# Patient Record
Sex: FEMALE | Race: WHITE | Employment: PART TIME | URBAN - METROPOLITAN AREA
[De-identification: names, ages, dates, MRNs, and addresses within clinical notes are randomized per-mention and may not be internally consistent; named-entity substitution may affect disease eponyms.]

---
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
## 2018-03-13 ENCOUNTER — OFFICE VISIT (OUTPATIENT)
Dept: FAMILY MEDICINE CLINIC | Age: 18
End: 2018-03-13

## 2018-03-13 VITALS
WEIGHT: 123 LBS | BODY MASS INDEX: 20.49 KG/M2 | HEIGHT: 65 IN | DIASTOLIC BLOOD PRESSURE: 70 MMHG | SYSTOLIC BLOOD PRESSURE: 110 MMHG | HEART RATE: 74 BPM | RESPIRATION RATE: 12 BRPM

## 2018-03-13 DIAGNOSIS — Z01.84 IMMUNITY STATUS TESTING: ICD-10-CM

## 2018-03-13 DIAGNOSIS — Z00.00 WELL ADULT EXAM: ICD-10-CM

## 2018-03-13 LAB
BASOPHILS ABSOLUTE: 0.1 K/UL (ref 0–0.2)
BASOPHILS RELATIVE PERCENT: 0.7 %
EOSINOPHILS ABSOLUTE: 0 K/UL (ref 0–0.6)
EOSINOPHILS RELATIVE PERCENT: 0.5 %
HBV SURFACE AB TITR SER: <3.5 MIU/ML
HCT VFR BLD CALC: 39.2 % (ref 36–48)
HEMOGLOBIN: 13.5 G/DL (ref 12–16)
LYMPHOCYTES ABSOLUTE: 2.1 K/UL (ref 1–5.1)
LYMPHOCYTES RELATIVE PERCENT: 25.7 %
MCH RBC QN AUTO: 32.1 PG (ref 26–34)
MCHC RBC AUTO-ENTMCNC: 34.5 G/DL (ref 31–36)
MCV RBC AUTO: 92.9 FL (ref 80–100)
MONOCYTES ABSOLUTE: 0.5 K/UL (ref 0–1.3)
MONOCYTES RELATIVE PERCENT: 6.5 %
NEUTROPHILS ABSOLUTE: 5.5 K/UL (ref 1.7–7.7)
NEUTROPHILS RELATIVE PERCENT: 66.6 %
PDW BLD-RTO: 13.8 % (ref 12.4–15.4)
PLATELET # BLD: 231 K/UL (ref 135–450)
PMV BLD AUTO: 9.5 FL (ref 5–10.5)
RBC # BLD: 4.22 M/UL (ref 4–5.2)
RUBELLA ANTIBODY IGG: 26.7 IU/ML
WBC # BLD: 8.3 K/UL (ref 4–11)

## 2018-03-13 PROCEDURE — 99395 PREV VISIT EST AGE 18-39: CPT | Performed by: NURSE PRACTITIONER

## 2018-03-13 ASSESSMENT — PATIENT HEALTH QUESTIONNAIRE - PHQ9
2. FEELING DOWN, DEPRESSED OR HOPELESS: 0
SUM OF ALL RESPONSES TO PHQ QUESTIONS 1-9: 0
SUM OF ALL RESPONSES TO PHQ9 QUESTIONS 1 & 2: 0
1. LITTLE INTEREST OR PLEASURE IN DOING THINGS: 0

## 2018-03-13 NOTE — PROGRESS NOTES
History and Physical      Haim Zee  YOB: 2000    Date of Service:  3/13/2018    Chief Complaint:   Haim Zee is a 21 y.o. female who presents for complete physical examination. HPI: Patient is here for her physical. She reports that she is doing well without compliants. She has not needed acne medication. 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 Wt Readings from Last 3 Encounters:   03/13/18 123 lb (55.8 kg) (41 %, Z= -0.23)*   09/04/17 116 lb 13.5 oz (53 kg) (30 %, Z= -0.52)*   04/03/17 117 lb (53.1 kg) (32 %, Z= -0.46)*     * Growth percentiles are based on [facility] 2-20 Years data. BP Readings from Last 3 Encounters:   03/13/18 110/70   09/04/17 (!) 95/55   04/03/17 110/80       Patient Active Problem List   Diagnosis    Allergic rhinitis, seasonal    Innocent heart murmur    Acne       Preventive Care:  Health Maintenance   Topic Date Due    HIV screen  08/13/2013    Chlamydia screen  08/13/2014    Flu vaccine (1) 09/01/2017    DTaP/Tdap/Td vaccine (7 - Td) 08/19/2021    Meningococcal (MCV) Vaccine Age 0-22 Years  Completed      Hx abnormal PAP: Never had pap smear. GYN is Dr. Isaias Dickinson.   Sexual activity: single partner, contraception - condoms and OCP (estrogen/progesterone)   Self-breast exams: no  Last eye exam: 2/2018, normal  Exercise: aerobics 7 time(s) per day  Seatbelt use: always   Diet-   Lipid panel: No results found for: CHOL, TRIG, HDL, LDLCALC, LDLDIRECT     Advance Directive: N, Not Received    Immunization History   Administered Date(s) Administered    DTaP 2000, 2000, 06/15/1999, 04/13/2000, 06/02/2003    HPV Gardasil Quadrivalent 06/22/2015, 08/24/2015, 03/14/2016    Hepatitis B, unspecified formulation 2000, 2000, 06/29/1999    Hib, unspecified foumulation 1998, 1998, 02/22/2000, 04/13/2000    IPV (Ipol) 1998, 1998, 04/13/2000, 09/05/2002    MMR 02/22/2000, 06/02/2003    Meningococcal MCV4P (Menactra) 06/22/2015    PPD Test 03/14/2016    Tdap (Boostrix, Adacel) 08/19/2011    Varicella (Varivax) 01/01/2003, 08/19/2011       No Known Allergies  Outpatient Prescriptions Marked as Taking for the 3/13/18 encounter (Office Visit) with Bettye Valencia CNP   Medication Sig Dispense Refill    Norethin-Eth Estrad-Fe Biphas (LO LOESTRIN FE PO) Take 1 tablet by mouth daily       albuterol (PROAIR HFA) 108 (90 BASE) MCG/ACT inhaler Inhale 2 puffs into the lungs every 4 hours as needed for Wheezing or Shortness of Breath 1 Inhaler 3    loratadine (CLARITIN) 10 MG tablet Take 10 mg by mouth daily. History reviewed. No pertinent past medical history. History reviewed. No pertinent surgical history. Family History   Problem Relation Age of Onset    Heart Disease Maternal Grandfather     Heart Disease Other      Social History     Social History    Marital status: Single     Spouse name: N/A    Number of children: N/A    Years of education: N/A     Occupational History    Not on file. Social History Main Topics    Smoking status: Never Smoker    Smokeless tobacco: Never Used      Comment: counseled on tobacco exposure avoidance    Alcohol use No    Drug use: No    Sexual activity: No     Other Topics Concern    Not on file     Social History Narrative    No narrative on file       Review of Systems:  All other systems reviewed and were negative except for what was noted in the HPI. Physical Exam:   Vitals:    03/13/18 1439   BP: 110/70   Site: Right Arm   Position: Sitting   Cuff Size: Medium Adult   Pulse: 74   Resp: 12   Weight: 123 lb (55.8 kg)   Height: 5' 5\" (1.651 m)     Body mass index is 20.47 kg/m². Constitutional: She is oriented to person, place, and time. She appears well-developed and well-nourished. No distress. HEENT:   Head: Normocephalic and atraumatic.    Right Ear: Tympanic membrane, external ear and ear canal normal.   Left Ear: Tympanic membrane, external ear and ear canal normal.   Nose: Nose normal.   Mouth/Throat: Oropharynx is clear and moist, and mucous membranes are normal.  There is no cervical adenopathy. Eyes: Conjunctivae and extraocular motions are normal. Pupils are equal, round, and reactive to light. Neck: Neck supple. No JVD present. Carotid bruit is not present. No mass and no thyromegaly present. Cardiovascular: Normal rate, regular rhythm, normal heart sounds and intact distal pulses. Exam reveals no gallop and no friction rub. No murmur heard. Pulmonary/Chest: Effort normal and breath sounds normal. No respiratory distress. She has no wheezes, rhonchi or rales. Abdominal: Soft, non-tender. Bowel sounds are normal. She exhibits no organomegaly. Musculoskeletal: Normal range of motion, no synovitis. She exhibits no edema. Neurological: She is alert and oriented to person, place, and time. She has normal reflexes. No cranial nerve deficit. Coordination normal.   Skin: Skin is warm and dry. There is no rash or erythema. No suspicious lesions noted. Psychiatric: She has a normal mood and affect. Her speech is normal and behavior is normal. Judgment, cognition and memory are normal.     Assessment/Plan:    Eliot Valentin was seen today for annual exam.    Diagnoses and all orders for this visit:    Well adult exam  -     Rubeola Antibody, IgG; Future  -     Mumps Antibody, IgG; Future  -     Rubella IGG; Future  -     Varicella Zoster Antibody, IgG; Future  -     Hepatitis B Surface Antibody; Future  -     CBC Auto Differential; Future  Healthy lifestyles reviewed: diet, aerobic exercise, sunscreen, vision and dental exams, safe sexual practices, avoiding alcohol and drugs, no texting and driving. Continue to f/u with GYN Dr. Luke Flores. Will need pap smear at age 22. Immunity status testing  -     Rubeola Antibody, IgG; Future  -     Mumps Antibody, IgG; Future  -     Rubella IGG; Future  -     Varicella Zoster Antibody, IgG;  Future  -     Hepatitis B

## 2018-03-15 LAB
MUV IGG SER QL: 5 AU/ML
RUBEOLA (MEASLES) AB IGG: >300 AU/ML
VZV IGG SER QL IA: 845 IV

## 2018-03-16 ENCOUNTER — NURSE ONLY (OUTPATIENT)
Dept: FAMILY MEDICINE CLINIC | Age: 18
End: 2018-03-16

## 2018-03-16 DIAGNOSIS — Z23 IMMUNIZATION DUE: Primary | ICD-10-CM

## 2018-03-16 PROCEDURE — 90472 IMMUNIZATION ADMIN EACH ADD: CPT | Performed by: FAMILY MEDICINE

## 2018-03-16 PROCEDURE — 90471 IMMUNIZATION ADMIN: CPT | Performed by: FAMILY MEDICINE

## 2018-03-16 PROCEDURE — 90707 MMR VACCINE SC: CPT | Performed by: FAMILY MEDICINE

## 2018-03-16 PROCEDURE — 90744 HEPB VACC 3 DOSE PED/ADOL IM: CPT | Performed by: FAMILY MEDICINE

## 2018-07-02 ENCOUNTER — TELEPHONE (OUTPATIENT)
Dept: FAMILY MEDICINE CLINIC | Age: 18
End: 2018-07-02

## 2018-07-02 DIAGNOSIS — Z11.59 NEED FOR HEPATITIS B SCREENING TEST: Primary | ICD-10-CM

## 2018-07-07 ENCOUNTER — NURSE ONLY (OUTPATIENT)
Dept: FAMILY MEDICINE CLINIC | Age: 18
End: 2018-07-07

## 2018-07-07 PROCEDURE — 86580 TB INTRADERMAL TEST: CPT | Performed by: FAMILY MEDICINE

## 2018-07-07 PROCEDURE — 90471 IMMUNIZATION ADMIN: CPT | Performed by: FAMILY MEDICINE

## 2018-07-07 PROCEDURE — 90744 HEPB VACC 3 DOSE PED/ADOL IM: CPT | Performed by: FAMILY MEDICINE

## 2018-07-09 ENCOUNTER — NURSE ONLY (OUTPATIENT)
Dept: FAMILY MEDICINE CLINIC | Age: 18
End: 2018-07-09

## 2018-07-09 LAB
INDURATION: NORMAL
TB SKIN TEST: NORMAL

## 2018-07-19 ENCOUNTER — NURSE ONLY (OUTPATIENT)
Dept: FAMILY MEDICINE CLINIC | Age: 18
End: 2018-07-19

## 2018-07-19 PROCEDURE — 86580 TB INTRADERMAL TEST: CPT | Performed by: FAMILY MEDICINE

## 2018-07-19 NOTE — PROGRESS NOTES
Pt arrived for her second tb test.     Tb given in pt's left forearm. [facility] [place]# [id number]  Exp ZDOO:01/12/9575      No complications and pt stated she would be back in this Saturday to have tb test read.

## 2018-07-21 ENCOUNTER — NURSE ONLY (OUTPATIENT)
Dept: FAMILY MEDICINE CLINIC | Age: 18
End: 2018-07-21

## 2018-07-23 LAB
INDURATION: NORMAL
TB SKIN TEST: NORMAL

## 2018-09-18 ENCOUNTER — TELEPHONE (OUTPATIENT)
Dept: FAMILY MEDICINE CLINIC | Age: 18
End: 2018-09-18

## 2018-09-18 ENCOUNTER — NURSE ONLY (OUTPATIENT)
Dept: FAMILY MEDICINE CLINIC | Age: 18
End: 2018-09-18

## 2018-09-18 DIAGNOSIS — Z92.29 HEPATITIS B NON-CONVERTER (POST-VACCINATION): Primary | ICD-10-CM

## 2018-09-18 DIAGNOSIS — Z23 NEED FOR HEPATITIS B VACCINATION: Primary | ICD-10-CM

## 2018-09-18 PROCEDURE — 90471 IMMUNIZATION ADMIN: CPT | Performed by: FAMILY MEDICINE

## 2018-09-18 PROCEDURE — 90746 HEPB VACCINE 3 DOSE ADULT IM: CPT | Performed by: FAMILY MEDICINE

## 2018-09-18 NOTE — TELEPHONE ENCOUNTER
Check hep B titer in 6 weeks. If still low will be called a non-converter and should not have further vaccines. Order placed.

## 2018-09-18 NOTE — PROGRESS NOTES
Pt. In the office today to have Hep B vaccine. Vaccine was given in right deltoid muscle. Pt. Tolerated well. School form was completed. Pt. Stated she need to have titers drawn after having Hep B vaccine to see if she has a immunity.

## 2018-09-18 NOTE — TELEPHONE ENCOUNTER
Pt. In today to receive her third Hep B vaccine for school. She stated that she needs to have her titers drawn again to determine if she has an immunity to Hep B after her vaccine series. Please place lab orders. Pt. Would like a call when labs are placed.

## 2018-11-09 DIAGNOSIS — Z92.29 HEPATITIS B NON-CONVERTER (POST-VACCINATION): ICD-10-CM

## 2018-11-10 LAB — HBV SURFACE AB TITR SER: 71.71 MIU/ML

## 2018-11-14 ENCOUNTER — TELEPHONE (OUTPATIENT)
Dept: FAMILY MEDICINE CLINIC | Age: 18
End: 2018-11-14

## 2018-11-14 NOTE — TELEPHONE ENCOUNTER
Patient called about Hep B results. I gave results to her Verbally  and she said yes she would also like her results mailed to her as well.  Thanks